# Patient Record
Sex: FEMALE | Race: WHITE | NOT HISPANIC OR LATINO | URBAN - NONMETROPOLITAN AREA
[De-identification: names, ages, dates, MRNs, and addresses within clinical notes are randomized per-mention and may not be internally consistent; named-entity substitution may affect disease eponyms.]

---

## 2017-09-20 ENCOUNTER — FOLLOW UP ESTABLISHED (OUTPATIENT)
Dept: URBAN - NONMETROPOLITAN AREA CLINIC 13 | Facility: CLINIC | Age: 37
End: 2017-09-20
Payer: COMMERCIAL

## 2017-09-20 DIAGNOSIS — H52.223 REGULAR ASTIGMATISM, BILATERAL: Primary | ICD-10-CM

## 2017-09-20 PROCEDURE — 92025 CPTRIZED CORNEAL TOPOGRAPHY: CPT | Performed by: OPTOMETRIST

## 2017-09-20 PROCEDURE — 76514 ECHO EXAM OF EYE THICKNESS: CPT | Performed by: OPTOMETRIST

## 2017-09-20 ASSESSMENT — VISUAL ACUITY
OS: 20/25
OD: 20/25

## 2017-09-20 ASSESSMENT — INTRAOCULAR PRESSURE
OS: 14
OD: 14

## 2017-09-20 ASSESSMENT — KERATOMETRY
OS: 48.13
OD: 48.50

## 2017-10-09 ENCOUNTER — FOLLOW UP ESTABLISHED (OUTPATIENT)
Dept: URBAN - METROPOLITAN AREA CLINIC 64 | Facility: CLINIC | Age: 37
End: 2017-10-09

## 2017-10-09 DIAGNOSIS — H52.13 MYOPIA, BILATERAL: ICD-10-CM

## 2017-10-09 ASSESSMENT — VISUAL ACUITY
OS: 20/25
OD: 20/25

## 2017-10-09 ASSESSMENT — KERATOMETRY
OS: 48.20
OD: 48.23

## 2017-10-25 ENCOUNTER — Encounter (OUTPATIENT)
Dept: URBAN - METROPOLITAN AREA CLINIC 66 | Facility: CLINIC | Age: 37
End: 2017-10-25

## 2017-10-25 PROCEDURE — LASIK LASIK SURGEON'S FEE: CUSTOM | Performed by: OPHTHALMOLOGY

## 2017-10-25 PROCEDURE — LASIK LASIK PRKSURGEON'S FEE: CUSTOM | Performed by: OPHTHALMOLOGY

## 2017-10-26 ENCOUNTER — POST OP (OUTPATIENT)
Dept: URBAN - NONMETROPOLITAN AREA CLINIC 13 | Facility: CLINIC | Age: 37
End: 2017-10-26

## 2017-10-26 DIAGNOSIS — Z96.1 PRESENCE OF INTRAOCULAR LENS: Primary | ICD-10-CM

## 2017-10-26 PROCEDURE — 99024 POSTOP FOLLOW-UP VISIT: CPT | Performed by: OPTOMETRIST

## 2017-11-02 ENCOUNTER — POST OP (OUTPATIENT)
Dept: URBAN - NONMETROPOLITAN AREA CLINIC 13 | Facility: CLINIC | Age: 37
End: 2017-11-02

## 2017-11-02 PROCEDURE — 99024 POSTOP FOLLOW-UP VISIT: CPT | Performed by: OPTOMETRIST

## 2017-11-02 ASSESSMENT — INTRAOCULAR PRESSURE
OD: 14
OS: 14

## 2017-11-02 ASSESSMENT — VISUAL ACUITY
OS: 20/20
OD: 20/20

## 2017-12-07 ENCOUNTER — POST OP (OUTPATIENT)
Dept: URBAN - NONMETROPOLITAN AREA CLINIC 13 | Facility: CLINIC | Age: 37
End: 2017-12-07

## 2017-12-07 PROCEDURE — 99024 POSTOP FOLLOW-UP VISIT: CPT | Performed by: OPTOMETRIST

## 2017-12-07 ASSESSMENT — INTRAOCULAR PRESSURE
OD: 10
OS: 10

## 2018-06-14 ENCOUNTER — FOLLOW UP ESTABLISHED (OUTPATIENT)
Dept: URBAN - NONMETROPOLITAN AREA CLINIC 13 | Facility: CLINIC | Age: 38
End: 2018-06-14
Payer: COMMERCIAL

## 2018-06-14 PROCEDURE — 99024 POSTOP FOLLOW-UP VISIT: CPT | Performed by: OPTOMETRIST

## 2018-06-14 ASSESSMENT — INTRAOCULAR PRESSURE
OS: 16
OD: 16

## 2018-06-14 ASSESSMENT — VISUAL ACUITY
OD: 20/20
OS: 20/20

## 2019-12-19 ENCOUNTER — FOLLOW UP ESTABLISHED (OUTPATIENT)
Dept: URBAN - NONMETROPOLITAN AREA CLINIC 13 | Facility: CLINIC | Age: 39
End: 2019-12-19
Payer: COMMERCIAL

## 2019-12-19 DIAGNOSIS — H04.123 TEAR FILM INSUFFICIENCY OF BILATERAL LACRIMAL GLANDS: Primary | ICD-10-CM

## 2019-12-19 PROCEDURE — 99212 OFFICE O/P EST SF 10 MIN: CPT | Performed by: OPTOMETRIST

## 2019-12-19 RX ORDER — NEOMYCIN SULFATE, POLYMYXIN B SULFATE AND DEXAMETHASONE 3.5; 10000; 1 MG/ML; [USP'U]/ML; MG/ML
SUSPENSION OPHTHALMIC
Qty: 1 | Refills: 1 | Status: ACTIVE
Start: 2019-12-19

## 2019-12-19 ASSESSMENT — INTRAOCULAR PRESSURE
OS: 12
OD: 10

## 2021-06-15 ENCOUNTER — OFFICE VISIT (OUTPATIENT)
Dept: URBAN - NONMETROPOLITAN AREA CLINIC 13 | Facility: CLINIC | Age: 41
End: 2021-06-15
Payer: COMMERCIAL

## 2021-06-15 PROCEDURE — 92012 INTRM OPH EXAM EST PATIENT: CPT | Performed by: OPTOMETRIST

## 2021-06-15 ASSESSMENT — VISUAL ACUITY
OD: 20/20
OS: 20/25

## 2021-06-15 ASSESSMENT — INTRAOCULAR PRESSURE
OD: 11
OS: 11

## 2021-06-15 NOTE — IMPRESSION/PLAN
Impression: Regular astigmatism, bilateral: H52.223. Plan: Consistent use of artificial tears is indicated for dryness noted.

## 2023-06-23 ENCOUNTER — OFFICE VISIT (OUTPATIENT)
Dept: URBAN - NONMETROPOLITAN AREA CLINIC 13 | Facility: CLINIC | Age: 43
End: 2023-06-23
Payer: COMMERCIAL

## 2023-06-23 DIAGNOSIS — H01.011 ULCERATIVE BLEPHARITIS OF RIGHT UPPER LID: Primary | ICD-10-CM

## 2023-06-23 PROCEDURE — 99213 OFFICE O/P EST LOW 20 MIN: CPT | Performed by: OPTOMETRIST

## 2023-06-23 RX ORDER — NEOMYCIN SULFATE, POLYMYXIN B SULFATE AND DEXAMETHASONE 3.5; 10000; 1 MG/G; [USP'U]/G; MG/G
OINTMENT OPHTHALMIC
Qty: 3 | Refills: 0 | Status: ACTIVE
Start: 2023-06-23

## 2023-06-23 ASSESSMENT — INTRAOCULAR PRESSURE
OS: 12
OD: 12

## 2023-06-23 NOTE — IMPRESSION/PLAN
Impression: Ulcerative blepharitis of right upper lid: H01.011. Plan: Monitor, pt needs to start pf tears, warm compresses, maxitrol sen at night on eyelids cliradex lid , monitor x 2-3 weeks, pt ed on s/s of worsening issue rtc asap.

## 2025-02-20 ENCOUNTER — OFFICE VISIT (OUTPATIENT)
Dept: URBAN - NONMETROPOLITAN AREA CLINIC 13 | Facility: CLINIC | Age: 45
End: 2025-02-20
Payer: COMMERCIAL

## 2025-02-20 DIAGNOSIS — H04.123 TEAR FILM INSUFFICIENCY OF BILATERAL LACRIMAL GLANDS: ICD-10-CM

## 2025-02-20 DIAGNOSIS — H52.4 PRESBYOPIA: Primary | ICD-10-CM

## 2025-02-20 PROCEDURE — 92012 INTRM OPH EXAM EST PATIENT: CPT | Performed by: OPTOMETRIST

## 2025-02-20 RX ORDER — NEOMYCIN SULFATE, POLYMYXIN B SULFATE AND DEXAMETHASONE 1; 3.5; 1 MG/ML; MG/ML; [USP'U]/ML
SUSPENSION OPHTHALMIC
Qty: 5 | Refills: 1 | Status: ACTIVE
Start: 2025-02-20

## 2025-02-20 ASSESSMENT — VISUAL ACUITY
OD: 20/20
OS: 20/20

## 2025-02-20 ASSESSMENT — INTRAOCULAR PRESSURE
OD: 15
OS: 15